# Patient Record
Sex: FEMALE | Race: WHITE | Employment: UNEMPLOYED | URBAN - METROPOLITAN AREA
[De-identification: names, ages, dates, MRNs, and addresses within clinical notes are randomized per-mention and may not be internally consistent; named-entity substitution may affect disease eponyms.]

---

## 2019-09-24 ENCOUNTER — OFFICE VISIT (OUTPATIENT)
Dept: URGENT CARE | Age: 24
End: 2019-09-24

## 2019-09-24 VITALS
WEIGHT: 111.9 LBS | HEIGHT: 62 IN | SYSTOLIC BLOOD PRESSURE: 113 MMHG | DIASTOLIC BLOOD PRESSURE: 57 MMHG | HEART RATE: 100 BPM | BODY MASS INDEX: 20.59 KG/M2 | OXYGEN SATURATION: 99 % | TEMPERATURE: 99.6 F | RESPIRATION RATE: 18 BRPM

## 2019-09-24 DIAGNOSIS — K59.03 DRUG-INDUCED CONSTIPATION: ICD-10-CM

## 2019-09-24 DIAGNOSIS — R21 RASH: Primary | ICD-10-CM

## 2019-09-24 RX ORDER — BUPRENORPHINE AND NALOXONE 8; 2 MG/1; MG/1
FILM, SOLUBLE BUCCAL; SUBLINGUAL DAILY
COMMUNITY

## 2019-09-24 NOTE — PATIENT INSTRUCTIONS
ER if fever, chills, nausea, vomiting or worsening abdominal pain     Constipation: Care Instructions  Your Care Instructions    Constipation means that you have a hard time passing stools (bowel movements). People pass stools from 3 times a day to once every 3 days. What is normal for you may be different. Constipation may occur with pain in the rectum and cramping. The pain may get worse when you try to pass stools. Sometimes there are small amounts of bright red blood on toilet paper or the surface of stools. This is because of enlarged veins near the rectum (hemorrhoids). A few changes in your diet and lifestyle may help you avoid ongoing constipation. Your doctor may also prescribe medicine to help loosen your stool. Some medicines can cause constipation. These include pain medicines and antidepressants. Tell your doctor about all the medicines you take. Your doctor may want to make a medicine change to ease your symptoms. Follow-up care is a key part of your treatment and safety. Be sure to make and go to all appointments, and call your doctor if you are having problems. It's also a good idea to know your test results and keep a list of the medicines you take. How can you care for yourself at home? · Drink plenty of fluids, enough so that your urine is light yellow or clear like water. If you have kidney, heart, or liver disease and have to limit fluids, talk with your doctor before you increase the amount of fluids you drink. · Include high-fiber foods in your diet each day. These include fruits, vegetables, beans, and whole grains. · Get at least 30 minutes of exercise on most days of the week. Walking is a good choice. You also may want to do other activities, such as running, swimming, cycling, or playing tennis or team sports. · Take a fiber supplement, such as Citrucel or Metamucil, every day. Read and follow all instructions on the label. · Schedule time each day for a bowel movement.  A daily routine may help. Take your time having your bowel movement. · Support your feet with a small step stool when you sit on the toilet. This helps flex your hips and places your pelvis in a squatting position. · Your doctor may recommend an over-the-counter laxative to relieve your constipation. Examples are Milk of Magnesia and MiraLax. Read and follow all instructions on the label. Do not use laxatives on a long-term basis. When should you call for help? Call your doctor now or seek immediate medical care if:    · You have new or worse belly pain.     · You have new or worse nausea or vomiting.     · You have blood in your stools.    Watch closely for changes in your health, and be sure to contact your doctor if:    · Your constipation is getting worse.     · You do not get better as expected. Where can you learn more? Go to http://[employer]jarett.info/. Enter 21 [id number] in the search box to learn more about \"Constipation: Care Instructions. \"  Current as of: June 26, 2019  Content Version: 12.2  © 6405-9956 [employer]. Care instructions adapted under license by [employer] (which disclaims liability or warranty for this information). If you have questions about a medical condition or this instruction, always ask your healthcare professional. [address] any warranty or liability for your use of this information. Rash: Care Instructions  Your Care Instructions  A rash is any irritation or inflammation of the skin. Rashes have many possible causes, including allergy, infection, illness, heat, and emotional stress. Follow-up care is a key part of your treatment and safety. Be sure to make and go to all appointments, and call your doctor if you are having problems. It's also a good idea to know your test results and keep a list of the medicines you take. How can you care for yourself at home? · Wash the area with water only.  Soap can make dryness and itching worse. Pat dry. · Put cold, wet cloths on the rash to reduce itching. · Keep cool, and stay out of the sun. · Leave the rash open to the air as much of the time as possible. · Sometimes petroleum jelly (Vaseline) can help relieve the discomfort caused by a rash. A moisturizing lotion, such as Cetaphil, also may help. Calamine lotion may help for rashes caused by contact with something (such as a plant or soap) that irritated the skin. Use it 3 or 4 times a day. · If your doctor prescribed a cream, use it as directed. If your doctor prescribed medicine, take it exactly as directed. · If your rash itches so badly that it interferes with your normal activities, take an over-the-counter antihistamine, such as diphenhydramine (Benadryl) or loratadine (Claritin). Read and follow all instructions on the label. When should you call for help? Call your doctor now or seek immediate medical care if:    · You have signs of infection, such as:  ? Increased pain, swelling, warmth, or redness. ? Red streaks leading from the area. ? Pus draining from the area. ? A fever.     · You have joint pain along with the rash.    Watch closely for changes in your health, and be sure to contact your doctor if:    · Your rash is changing or getting worse. For example, call if you have pain along with the rash, the rash is spreading, or you have new blisters.     · You do not get better after 1 week. Where can you learn more? Go to http://kim-jarett.info/. Enter B515 in the search box to learn more about \"Rash: Care Instructions. \"  Current as of: April 1, 2019  Content Version: 12.2  © 2408-6307 [employer]. Care instructions adapted under license by [employer] (which disclaims liability or warranty for this information).  If you have questions about a medical condition or this instruction, always ask your healthcare professional. [address] any warranty or liability for your use of this information.

## 2019-09-24 NOTE — PROGRESS NOTES
Rash    The history is provided by the patient. This is a new problem. The current episode started 2 days ago (son with similar rash). The problem has not changed since onset. The problem is associated with an unknown factor. There has been no fever. The rash is present on the left lower leg and right lower leg. The patient is experiencing no pain. The pain has been constant since onset. Pertinent negatives include no blisters, no itching and no pain. She has tried nothing for the symptoms. Constipation    This is a chronic (due to suboxone use) problem. The problem has not changed since onset. The problem occurs constantly. The pain is mild. The quality of the pain is described as dull. The pain does not radiate. Associated symptoms include abdominal pain. Pertinent negatives include no cough, no dizziness, no fever, no headaches, no nausea, no palpitations, no shortness of breath and no vomiting. Treatments tried: laxatives, stool softners. Past Medical History:   Diagnosis Date    Vaginal delivery         Past Surgical History:   Procedure Laterality Date    HX HEENT           History reviewed. No pertinent family history.      Social History     Socioeconomic History    Marital status: SINGLE     Spouse name: Not on file    Number of children: Not on file    Years of education: Not on file    Highest education level: Not on file   Occupational History    Not on file   Social Needs    Financial resource strain: Not on file    Food insecurity:     Worry: Not on file     Inability: Not on file    Transportation needs:     Medical: Not on file     Non-medical: Not on file   Tobacco Use    Smoking status: Current Every Day Smoker    Smokeless tobacco: Never Used   Substance and Sexual Activity    Alcohol use: No    Drug use: Not on file    Sexual activity: Not on file   Lifestyle    Physical activity:     Days per week: Not on file     Minutes per session: Not on file    Stress: Not on file Relationships    Social connections:     Talks on phone: Not on file     Gets together: Not on file     Attends Christian service: Not on file     Active member of club or organization: Not on file     Attends meetings of clubs or organizations: Not on file     Relationship status: Not on file    Intimate partner violence:     Fear of current or ex partner: Not on file     Emotionally abused: Not on file     Physically abused: Not on file     Forced sexual activity: Not on file   Other Topics Concern    Not on file   Social History Narrative    Not on file                ALLERGIES: Patient has no known allergies. Review of Systems   Constitutional: Negative for activity change, appetite change, chills and fever. HENT: Negative for congestion, ear pain, rhinorrhea, sinus pressure, sinus pain, sore throat and trouble swallowing. Respiratory: Negative for cough, shortness of breath and wheezing. Cardiovascular: Negative for chest pain and palpitations. Gastrointestinal: Positive for abdominal pain and constipation. Negative for nausea and vomiting. Musculoskeletal: Negative for myalgias. Skin: Positive for rash. Negative for itching. Neurological: Negative for dizziness and headaches. Hematological: Negative for adenopathy. Vitals:    09/24/19 1326   BP: 113/57   Pulse: 100   Resp: 18   Temp: 99.6 °F (37.6 °C)   SpO2: 99%   Weight: 111 lb 14.4 oz (50.8 kg)   Height: 5' 2\" (1.575 m)       Physical Exam   Constitutional: She appears well-developed and well-nourished. No distress. Abdominal: Soft. She exhibits no distension and no mass. There is generalized tenderness. There is no rigidity, no rebound and no guarding. Neurological: She is alert. Skin: Petechiae (BLE, diffuse, scattered, non-ttp, nonblanching) noted. She is not diaphoretic. Psychiatric: She has a normal mood and affect.  Her behavior is normal. Judgment and thought content normal.   Nursing note and vitals reviewed. [facility]    ICD-10-CM ICD-9-CM   1. Rash R21 782.1   2. Drug-induced constipation K59.03 564.09     E980.5     Rash likely viral. ER if fever, chills, N/V. Increase fluids, continue stool softener and laxative. The patients condition was discussed with the patient and they understand. The patient is to follow up with PCP. If signs and symptoms become worse the pt is to go to the ER. The patient is to take medications as prescribed. AVS given with patient instructions.             Procedures

## 2019-10-03 ENCOUNTER — OFFICE VISIT (OUTPATIENT)
Dept: URGENT CARE | Age: 24
End: 2019-10-03

## 2019-10-03 VITALS
BODY MASS INDEX: 20.43 KG/M2 | OXYGEN SATURATION: 98 % | HEIGHT: 62 IN | SYSTOLIC BLOOD PRESSURE: 108 MMHG | HEART RATE: 69 BPM | DIASTOLIC BLOOD PRESSURE: 62 MMHG | WEIGHT: 111 LBS | RESPIRATION RATE: 18 BRPM | TEMPERATURE: 98.9 F

## 2019-10-03 DIAGNOSIS — H66.92 ACUTE LEFT OTITIS MEDIA: Primary | ICD-10-CM

## 2019-10-03 RX ORDER — AMOXICILLIN 875 MG/1
875 TABLET, FILM COATED ORAL EVERY 12 HOURS
Qty: 20 TAB | Refills: 0 | Status: SHIPPED | OUTPATIENT
Start: 2019-10-03 | End: 2019-10-13

## 2019-10-03 RX ORDER — BENZONATATE 200 MG/1
200 CAPSULE ORAL
Qty: 30 CAP | Refills: 0 | Status: SHIPPED | OUTPATIENT
Start: 2019-10-03 | End: 2019-10-28

## 2019-10-03 NOTE — PATIENT INSTRUCTIONS

## 2019-10-03 NOTE — PROGRESS NOTES
Cold Symptoms   The history is provided by the patient. This is a new problem. Episode onset: 6 days ago. The problem occurs constantly. The problem has been gradually worsening. The cough is productive of sputum. There has been no fever. Associated symptoms include ear congestion (left), ear pain (left), rhinorrhea and sore throat. Pertinent negatives include no chest pain, no chills, no sweats, no headaches, no myalgias, no shortness of breath, no wheezing, no nausea and no vomiting. She has tried nothing for the symptoms. Past Medical History:   Diagnosis Date    Vaginal delivery         Past Surgical History:   Procedure Laterality Date    HX HEENT           History reviewed. No pertinent family history.      Social History     Socioeconomic History    Marital status: SINGLE     Spouse name: Not on file    Number of children: Not on file    Years of education: Not on file    Highest education level: Not on file   Occupational History    Not on file   Social Needs    Financial resource strain: Not on file    Food insecurity:     Worry: Not on file     Inability: Not on file    Transportation needs:     Medical: Not on file     Non-medical: Not on file   Tobacco Use    Smoking status: Current Every Day Smoker    Smokeless tobacco: Never Used   Substance and Sexual Activity    Alcohol use: No    Drug use: Not on file    Sexual activity: Not on file   Lifestyle    Physical activity:     Days per week: Not on file     Minutes per session: Not on file    Stress: Not on file   Relationships    Social connections:     Talks on phone: Not on file     Gets together: Not on file     Attends Roman Catholic service: Not on file     Active member of club or organization: Not on file     Attends meetings of clubs or organizations: Not on file     Relationship status: Not on file    Intimate partner violence:     Fear of current or ex partner: Not on file     Emotionally abused: Not on file     Physically abused: Not on file     Forced sexual activity: Not on file   Other Topics Concern    Not on file   Social History Narrative    Not on file                ALLERGIES: Patient has no known allergies. Review of Systems   Constitutional: Negative for activity change, appetite change, chills and fever. HENT: Positive for ear pain (left), rhinorrhea and sore throat. Negative for congestion, ear discharge, sinus pressure, sinus pain and trouble swallowing. Respiratory: Negative for cough, shortness of breath and wheezing. Cardiovascular: Negative for chest pain and palpitations. Gastrointestinal: Negative for nausea and vomiting. Musculoskeletal: Negative for myalgias. Neurological: Negative for dizziness and headaches. Hematological: Negative for adenopathy. Vitals:    10/03/19 1018   BP: 108/62   Pulse: 69   Resp: 18   Temp: 98.9 °F (37.2 °C)   SpO2: 98%   Weight: 111 lb (50.3 kg)   Height: 5' 2\" (1.575 m)       Physical Exam   Constitutional: She appears well-developed and well-nourished. No distress. HENT:   Right Ear: Tympanic membrane, external ear and ear canal normal.   Left Ear: External ear and ear canal normal. Tympanic membrane is erythematous and bulging. A middle ear effusion is present. Nose: Nose normal. Right sinus exhibits no maxillary sinus tenderness and no frontal sinus tenderness. Left sinus exhibits no maxillary sinus tenderness and no frontal sinus tenderness. Mouth/Throat: Oropharynx is clear and moist and mucous membranes are normal. No oropharyngeal exudate, posterior oropharyngeal edema, posterior oropharyngeal erythema or tonsillar abscesses. Cardiovascular: Normal rate, regular rhythm and normal heart sounds. Pulmonary/Chest: Effort normal and breath sounds normal. No respiratory distress. She has no wheezes. She has no rales. Lymphadenopathy:     She has no cervical adenopathy. Neurological: She is alert. Skin: She is not diaphoretic.    Psychiatric: She has a normal mood and affect. Her behavior is normal. Judgment and thought content normal.   Nursing note and vitals reviewed. MDM    ICD-10-CM ICD-9-CM   1. Acute left otitis media H66.92 382.9     Medications Ordered Today   Medications    amoxicillin (AMOXIL) 875 mg tablet     Sig: Take 1 Tab by mouth every twelve (12) hours for 10 days. Dispense:  20 Tab     Refill:  0    benzonatate (TESSALON) 200 mg capsule     Sig: Take 1 Cap by mouth three (3) times daily as needed for Cough. Dispense:  30 Cap     Refill:  0     The patients condition was discussed with the patient and they understand. The patient is to follow up with PCP. If signs and symptoms become worse the pt is to go to the ER. The patient is to take medications as prescribed. AVS given with patient instructions.             Procedures

## 2019-10-28 ENCOUNTER — OFFICE VISIT (OUTPATIENT)
Dept: URGENT CARE | Age: 24
End: 2019-10-28

## 2019-10-28 VITALS
HEART RATE: 88 BPM | TEMPERATURE: 98.3 F | OXYGEN SATURATION: 98 % | SYSTOLIC BLOOD PRESSURE: 107 MMHG | BODY MASS INDEX: 20.8 KG/M2 | HEIGHT: 62 IN | DIASTOLIC BLOOD PRESSURE: 55 MMHG | RESPIRATION RATE: 18 BRPM | WEIGHT: 113 LBS

## 2019-10-28 DIAGNOSIS — J20.8 ACUTE VIRAL BRONCHITIS: Primary | ICD-10-CM

## 2019-10-28 RX ORDER — BENZONATATE 200 MG/1
200 CAPSULE ORAL
Qty: 21 CAP | Refills: 0 | Status: SHIPPED | OUTPATIENT
Start: 2019-10-28 | End: 2019-11-04

## 2019-10-28 RX ORDER — ALBUTEROL SULFATE 0.83 MG/ML
2.5 SOLUTION RESPIRATORY (INHALATION)
Status: COMPLETED | OUTPATIENT
Start: 2019-10-28 | End: 2019-10-28

## 2019-10-28 RX ORDER — FLUTICASONE PROPIONATE 50 MCG
2 SPRAY, SUSPENSION (ML) NASAL DAILY
Qty: 1 BOTTLE | Refills: 0 | Status: SHIPPED | OUTPATIENT
Start: 2019-10-28 | End: 2021-02-25

## 2019-10-28 RX ORDER — ALBUTEROL SULFATE 90 UG/1
2 AEROSOL, METERED RESPIRATORY (INHALATION)
Qty: 1 INHALER | Refills: 0 | Status: SHIPPED | OUTPATIENT
Start: 2019-10-28 | End: 2021-02-25

## 2019-10-28 RX ORDER — PREDNISONE 20 MG/1
20 TABLET ORAL 2 TIMES DAILY
Qty: 10 TAB | Refills: 0 | Status: SHIPPED | OUTPATIENT
Start: 2019-10-28 | End: 2019-11-02

## 2019-10-28 RX ORDER — CETIRIZINE HCL 10 MG
10 TABLET ORAL DAILY
Qty: 30 TAB | Refills: 0 | Status: SHIPPED | OUTPATIENT
Start: 2019-10-28

## 2019-10-28 RX ADMIN — ALBUTEROL SULFATE 2.5 MG: 0.83 SOLUTION RESPIRATORY (INHALATION) at 11:56

## 2019-10-28 NOTE — PROGRESS NOTES
Cold Symptoms   The history is provided by the patient. This is a new problem. The current episode started more than 1 week ago. The problem occurs constantly. The problem has not changed since onset. The cough is non-productive. There has been no fever. Associated symptoms include rhinorrhea, sore throat and shortness of breath. Pertinent negatives include no chills and no wheezing. She has tried nothing for the symptoms. She is not a smoker. Her past medical history is significant for bronchitis. Her past medical history does not include asthma. Past Medical History:   Diagnosis Date    Vaginal delivery         Past Surgical History:   Procedure Laterality Date    HX HEENT           History reviewed. No pertinent family history.      Social History     Socioeconomic History    Marital status: SINGLE     Spouse name: Not on file    Number of children: Not on file    Years of education: Not on file    Highest education level: Not on file   Occupational History    Not on file   Social Needs    Financial resource strain: Not on file    Food insecurity:     Worry: Not on file     Inability: Not on file    Transportation needs:     Medical: Not on file     Non-medical: Not on file   Tobacco Use    Smoking status: Current Every Day Smoker    Smokeless tobacco: Never Used   Substance and Sexual Activity    Alcohol use: No    Drug use: Not on file    Sexual activity: Not on file   Lifestyle    Physical activity:     Days per week: Not on file     Minutes per session: Not on file    Stress: Not on file   Relationships    Social connections:     Talks on phone: Not on file     Gets together: Not on file     Attends Gnosticist service: Not on file     Active member of club or organization: Not on file     Attends meetings of clubs or organizations: Not on file     Relationship status: Not on file    Intimate partner violence:     Fear of current or ex partner: Not on file     Emotionally abused: Not on file     Physically abused: Not on file     Forced sexual activity: Not on file   Other Topics Concern    Not on file   Social History Narrative    Not on file                ALLERGIES: Patient has no known allergies. Review of Systems   Constitutional: Negative for chills. HENT: Positive for congestion, postnasal drip, rhinorrhea, sneezing and sore throat. Negative for trouble swallowing. Respiratory: Positive for cough, chest tightness and shortness of breath. Negative for wheezing. All other systems reviewed and are negative. Vitals:    10/28/19 1134   BP: 107/55   Pulse: 88   Resp: 18   Temp: 98.3 °F (36.8 °C)   SpO2: 98%   Weight: 113 lb (51.3 kg)   Height: 5' 2\" (1.575 m)       Physical Exam   Constitutional: No distress. HENT:   Right Ear: Tympanic membrane and ear canal normal.   Left Ear: Tympanic membrane and ear canal normal.   Nose: Nose normal.   Mouth/Throat: No oropharyngeal exudate, posterior oropharyngeal edema or posterior oropharyngeal erythema. Eyes: Conjunctivae are normal. Right eye exhibits no discharge. Left eye exhibits no discharge. Neck: Neck supple. Pulmonary/Chest: Effort normal. No respiratory distress. She has decreased breath sounds. She has no wheezes. She has rhonchi. She has no rales. Lymphadenopathy:     She has no cervical adenopathy. Skin: No rash noted. Nursing note and vitals reviewed. MDM    Procedures      ICD-10-CM ICD-9-CM    1. Acute viral bronchitis J20.8 466.0      Medications Ordered Today   Medications    albuterol (PROVENTIL VENTOLIN) nebulizer solution 2.5 mg     Order Specific Question:   MODE OF DELIVERY     Answer:   Nebulizer    albuterol (PROVENTIL HFA, VENTOLIN HFA, PROAIR HFA) 90 mcg/actuation inhaler     Sig: Take 2 Puffs by inhalation every six (6) hours as needed for Wheezing.      Dispense:  1 Inhaler     Refill:  0    benzonatate (TESSALON) 200 mg capsule     Sig: Take 1 Cap by mouth three (3) times daily as needed for Cough for up to 7 days. Dispense:  21 Cap     Refill:  0    predniSONE (DELTASONE) 20 mg tablet     Sig: Take 20 mg by mouth two (2) times a day for 5 days. With food     Dispense:  10 Tab     Refill:  0    cetirizine (ZYRTEC) 10 mg tablet     Sig: Take 1 Tab by mouth daily. Dispense:  30 Tab     Refill:  0    fluticasone propionate (FLONASE) 50 mcg/actuation nasal spray     Si Sprays by Both Nostrils route daily. Dispense:  1 Bottle     Refill:  0     No results found for any visits on 10/28/19. The patients condition was discussed with the patient and they understand. The patient is to follow up with primary care doctor. If signs and symptoms become worse the pt is to go to the ER. The patient is to take medications as prescribed.

## 2019-10-28 NOTE — PROGRESS NOTES
Cold Symptoms   The history is provided by the patient. This is a new problem. The current episode started more than 1 week ago. The problem occurs every few minutes. The problem has not changed since onset. The cough is non-productive. There has been no fever. Associated symptoms include rhinorrhea, sore throat, shortness of breath and wheezing. Pertinent negatives include no chest pain and no ear congestion. She has tried nothing for the symptoms. Her past medical history is significant for bronchitis. Past Medical History:   Diagnosis Date    Vaginal delivery         Past Surgical History:   Procedure Laterality Date    HX HEENT           History reviewed. No pertinent family history.      Social History     Socioeconomic History    Marital status: SINGLE     Spouse name: Not on file    Number of children: Not on file    Years of education: Not on file    Highest education level: Not on file   Occupational History    Not on file   Social Needs    Financial resource strain: Not on file    Food insecurity:     Worry: Not on file     Inability: Not on file    Transportation needs:     Medical: Not on file     Non-medical: Not on file   Tobacco Use    Smoking status: Current Every Day Smoker    Smokeless tobacco: Never Used   Substance and Sexual Activity    Alcohol use: No    Drug use: Not on file    Sexual activity: Not on file   Lifestyle    Physical activity:     Days per week: Not on file     Minutes per session: Not on file    Stress: Not on file   Relationships    Social connections:     Talks on phone: Not on file     Gets together: Not on file     Attends [employer] service: Not on file     Active member of club or organization: Not on file     Attends meetings of clubs or organizations: Not on file     Relationship status: Not on file    Intimate partner violence:     Fear of current or ex partner: Not on file     Emotionally abused: Not on file     Physically abused: Not on file Forced sexual activity: Not on file   Other Topics Concern    Not on file   Social History Narrative    Not on file                ALLERGIES: Patient has no known allergies. Review of Systems   HENT: Positive for congestion, rhinorrhea and sore throat. Respiratory: Positive for chest tightness, shortness of breath and wheezing. Cardiovascular: Negative for chest pain. All other systems reviewed and are negative. Vitals:    10/28/19 1134   BP: 107/55   Pulse: 88   Resp: 18   Temp: 98.3 °F (36.8 °C)   SpO2: 98%   Weight: 113 lb (51.3 kg)   Height: 5' 2\" (1.575 m)       Physical Exam   Constitutional: No distress. HENT:   Right Ear: Tympanic membrane and ear canal normal.   Left Ear: Tympanic membrane and ear canal normal.   Nose: Nose normal.   Mouth/Throat: No oropharyngeal exudate, posterior oropharyngeal edema or posterior oropharyngeal erythema. Eyes: Conjunctivae are normal. Right eye exhibits no discharge. Left eye exhibits no discharge. Neck: Neck supple. Pulmonary/Chest: Effort normal. No respiratory distress. She has decreased breath sounds. She has no wheezes. She has rhonchi. She has no rales. Lymphadenopathy:     She has no cervical adenopathy. Skin: No rash noted. Nursing note and vitals reviewed. MDM    Procedures      ICD-10-CM ICD-9-CM    1. Acute viral bronchitis J20.8 466.0      Medications Ordered Today   Medications    albuterol (PROVENTIL VENTOLIN) nebulizer solution 2.5 mg     Order Specific Question:   MODE OF DELIVERY     Answer:   Nebulizer    albuterol (PROVENTIL HFA, VENTOLIN HFA, PROAIR HFA) 90 mcg/actuation inhaler     Sig: Take 2 Puffs by inhalation every six (6) hours as needed for Wheezing. Dispense:  1 Inhaler     Refill:  0    benzonatate (TESSALON) 200 mg capsule     Sig: Take 1 Cap by mouth three (3) times daily as needed for Cough for up to 7 days.      Dispense:  21 Cap     Refill:  0    predniSONE (DELTASONE) 20 mg tablet     Sig: Take 20 mg by mouth two (2) times a day for 5 days. With food     Dispense:  10 Tab     Refill:  0    cetirizine (ZYRTEC) 10 mg tablet     Sig: Take 1 Tab by mouth daily. Dispense:  30 Tab     Refill:  0    fluticasone propionate (FLONASE) 50 mcg/actuation nasal spray     Si Sprays by Both Nostrils route daily. Dispense:  1 Bottle     Refill:  0     No results found for any visits on 10/28/19. The patients condition was discussed with the patient and they understand. The patient is to follow up with primary care doctor. If signs and symptoms become worse the pt is to go to the ER. The patient is to take medications as prescribed.

## 2019-11-08 ENCOUNTER — DOCUMENTATION ONLY (OUTPATIENT)
Dept: URGENT CARE | Age: 24
End: 2019-11-08

## 2020-10-22 ENCOUNTER — OFFICE VISIT (OUTPATIENT)
Dept: FAMILY MEDICINE CLINIC | Age: 25
End: 2020-10-22
Payer: MEDICAID

## 2020-10-22 VITALS
HEIGHT: 63 IN | TEMPERATURE: 97.2 F | DIASTOLIC BLOOD PRESSURE: 67 MMHG | RESPIRATION RATE: 17 BRPM | HEART RATE: 89 BPM | WEIGHT: 122.5 LBS | SYSTOLIC BLOOD PRESSURE: 107 MMHG | OXYGEN SATURATION: 98 % | BODY MASS INDEX: 21.71 KG/M2

## 2020-10-22 DIAGNOSIS — F33.1 MODERATE EPISODE OF RECURRENT MAJOR DEPRESSIVE DISORDER (HCC): ICD-10-CM

## 2020-10-22 DIAGNOSIS — F41.9 ANXIETY: ICD-10-CM

## 2020-10-22 DIAGNOSIS — Z76.89 ENCOUNTER TO ESTABLISH CARE: Primary | ICD-10-CM

## 2020-10-22 PROCEDURE — 99203 OFFICE O/P NEW LOW 30 MIN: CPT | Performed by: NURSE PRACTITIONER

## 2020-10-22 PROCEDURE — 4062F PT REFERRAL PSYCH DOCD: CPT | Performed by: NURSE PRACTITIONER

## 2020-10-22 RX ORDER — ESCITALOPRAM OXALATE 10 MG/1
10 TABLET ORAL DAILY
Qty: 30 TAB | Refills: 1 | Status: SHIPPED | OUTPATIENT
Start: 2020-10-22 | End: 2021-04-02

## 2020-10-22 NOTE — PROGRESS NOTES
Aries Amor is a 30 y.o. female    HIPAA verified by two patient identifiers. Chief Complaint   Patient presents with   [address] [place] [address]     1. Have you been to the ER, urgent care clinic since your last visit? Hospitalized since your last visit? No    2. Have you seen or consulted any other health care providers outside of the [address] [place] [address] since your last visit? Include any pap smears or colon screening. No    June 22, 2020 broke right hip and sternum, pt states that she is seeing someone at [facility] for hip but needs to have someone look at her sternum.     Visit Vitals  /67 (BP 1 Location: Left arm, BP Patient Position: Sitting)   Pulse 89   Temp 97.2 °F (36.2 °C) (Temporal)   Resp 17   Ht 5' 3\" (1.6 m)   Wt 122 lb 8 oz (55.6 kg)   SpO2 98%   BMI 21.70 kg/m²       Pain Scale: 4/10  Pain Location: Hip

## 2020-10-22 NOTE — PATIENT INSTRUCTIONS
1)      You have been prescribed Lexapro (escitalopram) 10mg which is a selective serotonin reuptake inhibitor (SSRI) used to treat low mood (depression) and/or anxiety. Serotonin is a chemical messenger in the brain that affects mood, social behavior, appetite, sexual desire, memory and sleep. SSRIs block the reabsorption of serotonin in the brain, so there is more serotonin available. Please take this medication daily and return to the clinic in 4 weeks to evaluate the effectiveness and dose. Please take this medication daily. Most people feel an effect within 1-2 weeks of starting the medication, but it can take up to 6-12 weeks to feel the full effect of the anti-depressant. Its important to keep in mind that each persons response to antidepressants is different, so give it a few weeks to start working. Side effects may include drowsiness, insomnia, weight change, dry mouth. If you are having uncomfortable side effects, tell your healthcare provider. Some side effects go away in 2-3 weeks. It might be possible to find a dose or alternate medication that causes fewer side effects if they are still bothering you after 6 weeks of trying the medication. Finding the right medication (or combination of medications) and the right doses can take some trial and error, so try not to get discouraged. Research shows that psychotherapy and antidepressants may be the best therapies for depression. Please look for a therapist.  Check with your insurance company for referrals for providers in the area that will be covered under your plan. Do not stop taking this drug all of a sudden without calling your healthcare provider. You may have a greater risk of side effects. If you need to stop this drug, you will want to slowly stop it as ordered    Avoid drinking alcohol while taking this drug.  Talk with your healthcare provider before you use other drugs and natural products that slow your actions.     In depression, sleep and appetite may get better soon after starting this drug. Other low mood signs may take up to 4 weeks to get better. Warning: Children and teens who take this drug may be at a greater risk of having thoughts or actions of suicide. Adults may also be at risk. The risk may be greater in people who have had these thoughts or actions in the past. Watch people who take this drug closely. Call the office right away if signs like low mood (depression), nervousness, restlessness, grouchiness, panic attacks, or changes in mood or actions are new or worse. Call 911 right away if any thoughts or actions of suicide occur. · Keep the numbers for these national suicide hotlines: 0-424-980-TALK (0-395.291.6135) and 0-988-RKNVLIH (5-453.963.1035). If you or someone you know talks about suicide or feeling hopeless, get help right away. Clinical depression is a medical condition that goes beyond everyday sadness. Depression may cause serious, long-lasting symptoms and often disrupts a persons ability to perform routine tasks. The disorder is the most common psychiatric disorder worldwide. In the [place], about one in six people experiences a bout of clinical depression in their lifetime. There are multiple therapies available to help with depression including psychotherapy, exercise (aerobic exercise and yoga are highly recommended), proper diet and sleep as well as medications. Research shows that psychotherapy and antidepressants may be the best therapies for depression. Please look for a therapist.  Check with your insurance company for referrals for providers in the area that will be covered under your plan. · Keep the numbers for these national suicide hotlines: 5-350-375-TALK (2-381.864.4368) and 8-467-CJEHOJD (8-273.730.8119). If you or someone you know talks about suicide or feeling hopeless, get help right away.   ·   Steps you can do on your own to feel better:  Deep breathing exercises: Deep breathing triggers a relaxation response, helping to change from the \"fight-or-flight\" response anxiety brings on. Inhale slowly to a count of 4, starting at your belly and then moving into your chest.  Gently hold your breath for 4 counts, then slowly exhale to 4 counts. \"Clench\" exercise - clench various zones in your body for 30 seconds, then an overall body clench  Exercise can help many people feel less anxious. Regular cardiovascular exercise (such as fast walking,) release endorphins - the \"feel good\" hormone in our body - and can reduce anxiety. Proper sleep:  Sleep is important to overall health. Not getting enough sleep can cause fatigue, inattention, and irritabililty, causing anxiety levels to increase. Healthy Diet: a healthy diet rich in whole grains, vegetables and fruits is healthier than simple carbohydrates found in processed foods. Skipping meals can cause blood sugar to drop and cause jittery feelings that could worsening underlying anxiety. It also a good idea to cut down on or stop drinking coffee and other sources of caffeine. Caffeine can make anxiety worse. Find \"Me\" time - it is important to take some time just to focus on you and help alleviate stress in daily life. This could be daily exercise, walking the dog, sitting in a quiet place without distraction, meditation, etc.     Medical treatments include:  ? Psychotherapy - Psychotherapy involves meeting with a mental health counselor to talk about your feelings, relationships, and worries. Therapy can help you find new ways of thinking about your situation so that you feel less anxious. In therapy, you might also learn new skills to reduce anxiety. You can go to Psychology Today's website to find a counselor. · Go to www. [employer]. [employer]  · Enter your zip code. · Click on your insurance carrier (usually on left side of screen). · Then click any other parameters you desire.    This will result in a list of providers. Click on any provider to learn more about them or see the contact information. Please choose a provider, call them, and schedule an appointment. ?Medicines - Medicines used to treat depression and can relieve anxiety. Some people have psychotherapy and take medicines at the same time. Phone apps that can help with anxiety:  CALM - Use the principles of mindfulness and meditation to ease your mind and keep anxiety at [address] [place] [address]. The AnTuTuene interface is just the beginning. Once it opens, there are relaxing sounds and sleep stories. Enjoy guided meditations at various lengths to help with everything from building self-esteem to calming anxiety. RAY - Self-help for Anxiety Management - range of self-help methods. Helps you understand what causes your anxiety, monitor your anxious thoughts and behavior over time and manage your anxiety through self-help exercises and private reflection. SIMPLE HABIT - guided meditation for anxiety relief    YOUPER  - emotional health assistant. The alexander employs a mood tracker, a chat interface, and guided mindfulness and learns from each user with AI to deliver the most effective support available. [facility] alexander that allows you to speak to a therapist without leaving home, connecting you to [id number] of licensed therapists with medical training. BOOSTERBUDDY   This alexander offers a novel way for teens and young adults to improve their resilience and work toward being healthier both physically and emotionally. 7 CUPS  7 Cups uses trained, volunteer, active listeners to provide free, anonymous, and confidential emotional support to people needing help coping with acute stressors and long-term mental health issues. Leena Arcos 82 apps provide people with quick resources for tracking their cognitive distortions, activities, and safety plan in case of an emergency.       SLEEPBOT   SleepBot is a quick and simple way for people to log their sleep and improve their sleep hygiene. [facility] ALEXANDER   The Whats Up? alexander helps people monitor their mood and uses principles of CBT and acceptance commitment therapy (ACT) to help people reframe their thoughts and cognitive distortions. Other apps: Mitch, [facility], [place], [place], [employer], [facility], [place]  Quit That!  - alexander for addictions    [address]: [phone]  Crisis: [address] [place] [address] Avenue: [phone]  Crisis: [address]: [phone]  Crisis: [phone]    2) Ask [facility] if they have a referral for sternum - otherwise, send me a [employer] Karmat message and I can put one in for you. However, as we discussed, good to have the orthopedic issues with one practice for continuity of care. 3) Allergy management:     1)  Take a daily antihistamine to reduce allergic symptoms such as sneezing, runny nose and itchy eyes. You can buy these over the counter (OTC = no prescription needed) such as Claritin, Allegra or Zyrtec. Take this daily as it takes 3-4 weeks for the full therapeutic effect to take place. Follow directions on package. If symptoms of sneezing, coughing and runny nose are severe, you can try taking Benadryl at night before bed. However, Benadryl can cause drowsiness, so please use caution. Elderly people should not use Benadryl due to side effects and increase risk of falling. 2)  Use an OTC decongestant nasal spray such as Flonase, Nasonex, or Rhinocort. These contain a steroid and will help reduce congestion for 3-5 days. You can spray 2x in each nostril two times a day. Use the opposite hand of the nostril you are spraying and look down at your toes when you administer the nasal spray. This ensures the spray is applied to the nasal tissue properly.       If you use Afrin for nasal congestion, DO NOT use for more than 5 days (due to rebound congestion)     3) You can also use a saline nasal spray 3-4 times a day or a shantel potty (never use tap water due to possible bacterial contamination) to help flush out the sinuses. This helps reduce the irritants that can increase allergic symptoms. 4)  OTC Robitussin or Delsym for cough relief. Follow directions on package. Do not exceed maximum dose. May cause drowsiness. If you have high blood pressure or kidney problems, avoid cough medicines that contain decongestants -- such as pseudoephedrine, ephedrine, phenylephrine, naphazoline and oxymetazoline. 5) Warm salt water gargle can help alleviate sore throat    6) Try taking a teaspoon of local honey 2x day (but no more than 1 tablespoon a day) - to help strengthen your body's tolerance to allergens. It is important to buy local honey as the bees use plants in your area to produce their honey. Honey is also a natural cough suppressant. Eat a healthy diet, drink plenty of water and get 8 hours of sleep nightly. 4) Migraine  Migraine headaches are one of the most disabling illnesses in the world. Although they can affect men, women and children, women have a higher rate of migraine headaches. Lifestyle modifications that must be started to prevent migraines include eating a healthy diet, daily exercise, getting a minimum of 7-8 hours sleep at night, and stop smoking. Additionally, keep a journal of when migraines occur, food eaten, stress, etc to better identify triggers. Treatment is aimed at preventative (daily medication) or abortive (symptom management once migraine occurs). Abortive treatment includes over the counter NSAIDS, (such as Excedrin Migraine, naproxen, ibuprofen), lidocaine cream and triptans. Preventative treatment includes beta blockers, antidepressants and anticonvulsant medications.  Please note that Fioricet is NOT recommended as a treatment for migraines. Complementary Alternative Medicine (CAM) for Migraines    The following therapies have been shown to help reduce the frequency and pain intensity associated with migraines:  1) Yoga - reduces anxiety/depression, helps with back pain as well. Therapy: 5 times per week  2) Aerobic exercise - affects cardiopulmonary, inflammatory and neurovascular processes. Additionally, endorphins released during exercise are \"natural\" painkllers  Therapy: individual, based on health and fitness level  2) Butterbur (Petasites hyubrius) - an herb with anti-inflammatory properties; Dose: 50-75 mg twice a day  3) Feverfew (tanacetum parthenium) - an herb used for second line treatment with anti-inflammatory properties. Dose:  mg/daily    Massage of the trapezius and supraspinatus fibers (areas around the neck and shoulders) may help reduce pain intensity during migraines     Some migraine sufferers may have low levels of minerals and vitamins used at the cellular level. Taking supplements of Coenzyme Q10 (150-300mg/day), magnesium (400mg/daily) and/or riboflavin (400mg/daily) may help prevent migraines. There is a supplement called Migrelief - which is a combination of things that are typically low in migraine sufferers (riboflavin, magnesium, feverfew, Chasteberry (can reduce effectiveness of OBC bc estrogen), L-Theanine (amino acid) D-Biotin (BS stabilizers)  As with all supplements, herbs and vitamins, please be aware of any interactions that might occur with current medications. Keep a diary of migraines - include when symptoms start, how often they occur, where pain is located and type of pain, other symptoms (such as nausea, vision problems), how long headaches last, what treatment you took and how effective it is.   Also helpful to record diet, exercise, vitamins, amount of sleep, weather, menstrual cycle, etc.     If headache becomes \"the worst headache of your life\", you have changes in vision, experience weakness, numbness or tingling, or spike a fever, seek immediate medical attention, call 911. Recovering From Depression: Care Instructions  Your Care Instructions     Taking good care of yourself is important as you recover from depression. In time, your symptoms will fade as your treatment takes hold. Do not give up. Instead, focus your energy on getting better. Your mood will improve. It just takes some time. Focus on things that can help you feel better, such as being with friends and family, eating well, and getting enough rest. But take things slowly. Do not do too much too soon. You will begin to feel better gradually. Follow-up care is a key part of your treatment and safety. Be sure to make and go to all appointments, and call your doctor if you are having problems. It's also a good idea to know your test results and keep a list of the medicines you take. How can you care for yourself at home? Be realistic  · If you have a large task to do, break it up into smaller steps you can handle, and just do what you can. · You may want to put off important decisions until your depression has lifted. If you have plans that will have a major impact on your life, such as marriage, divorce, or a job change, try to wait a bit. Talk it over with friends and loved ones who can help you look at the overall picture first.  · Reaching out to people for help is important. Do not isolate yourself. Let your family and friends help you. Find someone you can trust and confide in, and talk to that person. · Be patient, and be kind to yourself. Remember that depression is not your fault and is not something you can overcome with willpower alone. Treatment is important for depression, just like for any other illness. Feeling better takes time, and your mood will improve little by little. Stay active  · Stay busy and get outside. Take a walk, or try some other light exercise.   · Talk with your doctor about an exercise program. Exercise can help with mild depression. · Go to a movie or concert. Take part in a Judaism activity or other social gathering. Go to a ball game. · Ask a friend to have dinner with you. Take care of yourself  · Eat a balanced diet with plenty of fresh fruits and vegetables, whole grains, and lean protein. If you have lost your appetite, eat small snacks rather than large meals. · Avoid using illegal drugs or marijuana and drinking alcohol. Do not take medicines that have not been prescribed for you. They may interfere with medicines you may be taking for depression, or they may make your depression worse. · Take your medicines exactly as they are prescribed. You may start to feel better within 1 to 3 weeks of taking antidepressant medicine. But it can take as many as 6 to 8 weeks to see more improvement. If you have questions or concerns about your medicines, or if you do not notice any improvement by 3 weeks, talk to your doctor. · Continue to take your medicine after your symptoms improve. Taking your medicine for at least 6 months after you feel better can help keep you from getting depressed again. If this isn't the first time you have been depressed, your doctor may recommend you to take medicine even longer. · If you have any side effects from your medicine, tell your doctor. Many side effects are mild and will go away on their own after you have been taking the medicine for a few weeks. Some may last longer. Talk to your doctor if side effects are bothering you too much. You might be able to try a different medicine. · Continue counseling. It may help prevent depression from returning, especially if you've had multiple episodes of depression. Talk with your counselor if you are having a hard time attending your sessions or you think the sessions aren't working. Don't just stop going. · Get enough sleep. Talk to your doctor if you are having problems sleeping.   · Avoid sleeping pills unless they are prescribed by the doctor treating your depression. Sleeping pills may make you groggy during the day, and they may interact with other medicine you are taking. · If you have any other illnesses, such as diabetes, heart disease, or high blood pressure, make sure to continue with your treatment. Tell your doctor about all of the medicines you take, including those with or without a prescription. · If you or someone you know talks about suicide, self-harm, or feeling hopeless, get help right away. Call the [address] [place] [address] at 1-800-273-talk (0-207.511.8643) or text HOME to 795996 to access the Crisis Text Line. Consider saving these numbers in your phone. When should you call for help? Call 911 anytime you think you may need emergency care. For example, call if:    · You feel like hurting yourself or someone else.     · Someone you know has depression and is about to attempt or is attempting suicide. Call your doctor now or seek immediate medical care if:    · You hear voices.     · Someone you know has depression and:  ? Starts to give away his or her possessions. ? Uses illegal drugs or drinks alcohol heavily. ? Talks or writes about death, including writing suicide notes or talking about guns, knives, or pills. ? Starts to spend a lot of time alone. ? Acts very aggressively or suddenly appears calm. Watch closely for changes in your health, and be sure to contact your doctor if:    · You do not get better as expected. Where can you learn more? Go to http://www.gray.com/  Enter N529 in the search box to learn more about \"Recovering From Depression: Care Instructions. \"  Current as of: January 31, 2020               Content Version: 12.6  © 6094-4169 [employer]. Care instructions adapted under license by [employer] (which disclaims liability or warranty for this information).  If you have questions about a medical condition or this instruction, always ask your healthcare professional. [place] [address] any warranty or liability for your use of this information.

## 2020-11-25 ENCOUNTER — OFFICE VISIT (OUTPATIENT)
Dept: URGENT CARE | Age: 25
End: 2020-11-25
Payer: MEDICAID

## 2020-11-25 VITALS — RESPIRATION RATE: 15 BRPM | OXYGEN SATURATION: 99 % | TEMPERATURE: 98.8 F | HEART RATE: 77 BPM

## 2020-11-25 DIAGNOSIS — R05.9 COUGH: ICD-10-CM

## 2020-11-25 DIAGNOSIS — Z20.822 ENCOUNTER FOR LABORATORY TESTING FOR COVID-19 VIRUS: Primary | ICD-10-CM

## 2020-11-25 DIAGNOSIS — R09.81 NASAL CONGESTION: ICD-10-CM

## 2020-11-25 PROCEDURE — 99213 OFFICE O/P EST LOW 20 MIN: CPT | Performed by: EMERGENCY MEDICINE

## 2020-11-25 NOTE — PROGRESS NOTES
Pt here with positive COVID exposure and then on Sat some mild cold like sx with congestion, throat irritation and then cough and fatigue. Sxs are getting better but wants tested. No FCNV SOB. This patient was seen in [facility] at [address] [place] [address] Urgent Care while outdoors at their vehicle due to COVID-19 pandemic with PPE and focused examination in order to decrease community viral transmission       The history is provided by the patient. Past Medical History:   Diagnosis Date    Vaginal delivery         Past Surgical History:   Procedure Laterality Date    HX HEENT           History reviewed. No pertinent family history.      Social History     Socioeconomic History    Marital status: SINGLE     Spouse name: Not on file    Number of children: Not on file    Years of education: Not on file    Highest education level: Not on file   Occupational History    Not on file   Social Needs    Financial resource strain: Not on file    Food insecurity     Worry: Not on file     Inability: Not on file    Transportation needs     Medical: Not on file     Non-medical: Not on file   Tobacco Use    Smoking status: Current Every Day Smoker    Smokeless tobacco: Never Used   Substance and Sexual Activity    Alcohol use: No    Drug use: Not on file    Sexual activity: Not on file   Lifestyle    Physical activity     Days per week: Not on file     Minutes per session: Not on file    Stress: Not on file   Relationships    Social connections     Talks on phone: Not on file     Gets together: Not on file     Attends Oriental orthodox service: Not on file     Active member of club or organization: Not on file     Attends meetings of clubs or organizations: Not on file     Relationship status: Not on file    Intimate partner violence     Fear of current or ex partner: Not on file     Emotionally abused: Not on file     Physically abused: Not on file     Forced sexual activity: Not on file   Other Topics Concern    Not on file   Social History Narrative    Not on file                ALLERGIES: Patient has no known allergies. Review of Systems   Constitutional: Positive for fatigue. Negative for chills, diaphoresis and fever. HENT: Positive for congestion and sore throat (mld, scratchy). Negative for sinus pressure, sneezing, trouble swallowing and voice change. Respiratory: Positive for cough (on and off, some chronic bc smoker). Negative for chest tightness and shortness of breath. Cardiovascular: Negative for chest pain. Gastrointestinal: Negative for abdominal pain, diarrhea, nausea and vomiting. Musculoskeletal: Negative for myalgias. Skin: Negative for rash. Neurological: Negative for headaches. Vitals:    11/25/20 1750   Pulse: 77   Resp: 15   Temp: 98.8 °F (37.1 °C)   SpO2: 99%       Physical Exam  Vitals signs and nursing note reviewed. Constitutional:       General: She is not in acute distress. Appearance: Normal appearance. She is normal weight. She is not ill-appearing or toxic-appearing. Comments: Pt examined in a vehicle  Well appearing and converational   HENT:      Nose: No rhinorrhea. Mouth/Throat:      Mouth: Mucous membranes are moist.      Pharynx: Oropharynx is clear. No oropharyngeal exudate or posterior oropharyngeal erythema. Neck:      Musculoskeletal: Normal range of motion and neck supple. Cardiovascular:      Rate and Rhythm: Normal rate and regular rhythm. Heart sounds: Normal heart sounds. Pulmonary:      Effort: Pulmonary effort is normal. No respiratory distress. Breath sounds: Normal breath sounds. No stridor. No wheezing, rhonchi or rales. Comments: No cough  Abdominal:      General: Bowel sounds are normal.   Lymphadenopathy:      Cervical: No cervical adenopathy. Neurological:      Mental Status: She is alert and oriented to person, place, and time.    Psychiatric:         Mood and Affect: Mood normal.         Behavior: Behavior normal.         MDM    ICD-10-CM ICD-9-CM    1. Encounter for laboratory testing for COVID-19 virus  Z20.828 V01.79 NOVEL CORONAVIRUS (COVID-19)   2. Cough  R05 786.2 NOVEL CORONAVIRUS (COVID-19)   3. Nasal congestion  R09.81 478.19 NOVEL CORONAVIRUS (COVID-19)     No orders of the defined types were placed in this encounter. The patient's condition and possible alternative diagnoses were discussed with the patient and they verbalized understanding. The patient is to follow up with their primary care doctor for continued care. If signs and symptoms persist or become worse or new symptoms develop, the pt is to go immediately to the emergency department. Any new medications that may have been written for should be taken as directed but should always be discussed with the primary care physician and pharmacist. This was communicated to the patient. Pt offered strep and flu test but stated she only wants COVID because of sx and exposure      Pt instructed to quarantine until COVID testing results are back and then duration of quarantine will depend on result, current recommendations and symptoms. The patient is to get immediate re-evaluation for any new or worsening symptoms. They are to quarantine from other household members. It was recommended they stay hydrated and practice deep breathing exercises.          Procedures

## 2020-11-27 LAB — SARS-COV-2, NAA: NOT DETECTED

## 2021-02-25 ENCOUNTER — OFFICE VISIT (OUTPATIENT)
Dept: URGENT CARE | Age: 26
End: 2021-02-25
Payer: MEDICAID

## 2021-02-25 VITALS — HEART RATE: 72 BPM | OXYGEN SATURATION: 100 % | TEMPERATURE: 98.2 F | RESPIRATION RATE: 18 BRPM

## 2021-02-25 DIAGNOSIS — R51.9 ACUTE NONINTRACTABLE HEADACHE, UNSPECIFIED HEADACHE TYPE: Primary | ICD-10-CM

## 2021-02-25 DIAGNOSIS — J02.9 SORE THROAT: ICD-10-CM

## 2021-02-25 DIAGNOSIS — Z20.822 EXPOSURE TO COVID-19 VIRUS: ICD-10-CM

## 2021-02-25 LAB — SARS-COV-2 POC: NEGATIVE

## 2021-02-25 PROCEDURE — 87426 SARSCOV CORONAVIRUS AG IA: CPT | Performed by: FAMILY MEDICINE

## 2021-02-25 PROCEDURE — 99213 OFFICE O/P EST LOW 20 MIN: CPT | Performed by: FAMILY MEDICINE

## 2021-02-25 NOTE — LETTER
February 25, 2021 Lakesha Wayne  
[address] [facility] [address] [address] Dear Steven Wong: Thank you for requesting access to Disconnect. Please follow the instructions below to securely access and download your online medical record. Disconnect allows you to send messages to your doctor, view your test results, renew your prescriptions, schedule appointments, and more. How Do I Sign Up? 1. In your internet browser, go to [email or web address]. 2. Click on the First Time User? Click Here link in the Sign In box. You will see the New Member Sign Up page. 3. Enter your Disconnect Access Code exactly as it appears below. You will not need to use this code after youve completed the sign-up process. If you do not sign up before the expiration date, you must request a new code. Disconnect Access Code: [email or web address] Expires: 4/11/2021  4:15 PM  
 
4. Enter the last four digits of your Social Security Number (xxxx) and Date of Birth (mm/dd/yyyy) as indicated and click Submit. You will be taken to the next sign-up page. 5. Create a Disconnect ID. This will be your Disconnect login ID and cannot be changed, so think of one that is secure and easy to remember. 6. Create a Disconnect password. You can change your password at any time. 7. Enter your Password Reset Question and Answer. This can be used at a later time if you forget your password. 8. Enter your e-mail address. You will receive e-mail notification when new information is available in [address]. 9. Click Sign Up. You can now view and download portions of your medical record. 10. Click the Download Summary menu link to download a portable copy of your medical information. Additional Information If you have questions, please visit the Frequently Asked Questions section of the Disconnect website at [email or web address] Remember, Disconnect is NOT to be used for urgent needs. For medical emergencies, dial 911.  
 
Now available from your iPhone and Android! Sincerely, [employer]

## 2021-02-25 NOTE — PROGRESS NOTES
This patient was seen at [address] [place] [address] Urgent Care while in their vehicle due to COVID-19 pandemic with PPE and focused examination in order to decrease community viral transmission. The patient/guardian gave verbal consent to treat. Zoe Inman is a 30 y.o. female who presents with HA x 4 days. Had ST, now resolved. Was exposed to COVID-19 2 weeks ago. Denies cough, fever, SOB. The history is provided by the patient. Past Medical History:   Diagnosis Date    Vaginal delivery         Past Surgical History:   Procedure Laterality Date    HX HEENT           History reviewed. No pertinent family history.      Social History     Socioeconomic History    Marital status: SINGLE     Spouse name: Not on file    Number of children: Not on file    Years of education: Not on file    Highest education level: Not on file   Occupational History    Not on file   Social Needs    Financial resource strain: Not on file    Food insecurity     Worry: Not on file     Inability: Not on file    Transportation needs     Medical: Not on file     Non-medical: Not on file   Tobacco Use    Smoking status: Current Every Day Smoker    Smokeless tobacco: Never Used   Substance and Sexual Activity    Alcohol use: No    Drug use: Not on file    Sexual activity: Not on file   Lifestyle    Physical activity     Days per week: Not on file     Minutes per session: Not on file    Stress: Not on file   Relationships    Social connections     Talks on phone: Not on file     Gets together: Not on file     Attends Denominational service: Not on file     Active member of club or organization: Not on file     Attends meetings of clubs or organizations: Not on file     Relationship status: Not on file    Intimate partner violence     Fear of current or ex partner: Not on file     Emotionally abused: Not on file     Physically abused: Not on file     Forced sexual activity: Not on file   Other Topics Concern    Not on file   Social History Narrative    Not on file                ALLERGIES: Patient has no known allergies. Review of Systems   Constitutional: Negative for activity change, appetite change, chills and fever. HENT: Positive for sore throat. Respiratory: Negative for cough, shortness of breath and wheezing. Gastrointestinal: Negative for diarrhea, nausea and vomiting. Musculoskeletal: Negative for myalgias. Neurological: Positive for headaches. Vitals:    02/25/21 1630   Pulse: 72   Resp: 18   Temp: 98.2 °F (36.8 °C)   SpO2: 100%       Physical Exam  Vitals signs and nursing note reviewed. Constitutional:       General: She is not in acute distress. Appearance: She is well-developed. She is not diaphoretic. Pulmonary:      Effort: Pulmonary effort is normal. No respiratory distress. Breath sounds: Normal breath sounds. No stridor. No wheezing, rhonchi or rales. Neurological:      Mental Status: She is alert. Psychiatric:         Behavior: Behavior normal.         Thought Content: Thought content normal.         Judgment: Judgment normal.         MDM     Amount and/or Complexity of Data Reviewed:   Clinical lab tests:  Ordered and reviewed      ICD-10-CM ICD-9-CM   1. Acute nonintractable headache, unspecified headache type  R51.9 784.0   2. Sore throat  J02.9 462   3.  Exposure to COVID-19 virus  Z20.822 V01.79       Orders Placed This Encounter    NOVEL CORONAVIRUS (COVID-19)     Scheduling Instructions:      1) Due to current limited availability of the COVID-19 PCR test, tests will be prioritized and may not be completed.              2) Order only if the test result will change clinical management or necessary for a return to mission-critical employment decision.              3) Print and instruct patient to adhere to CDC home isolation program. (Link Above)              4) Set up or refer patient for a monitoring program.              5) Have patient sign up for and leverage MyChart (if not previously done). Order Specific Question:   Is this test for diagnosis or screening? Answer:   Diagnosis of ill patient     Order Specific Question:   Symptomatic for COVID-19 as defined by CDC? Answer:   Yes     Order Specific Question:   Date of Symptom Onset     Answer:   2/22/2021     Order Specific Question:   Hospitalized for COVID-19? Answer:   No     Order Specific Question:   Admitted to ICU for COVID-19? Answer:   No     Order Specific Question:   Employed in healthcare setting? Answer:   No     Order Specific Question:   Resident in a congregate (group) care setting? Answer:   No     Order Specific Question:   Pregnant? Answer:   No     Order Specific Question:   Previously tested for COVID-19? Answer: Yes    AMB POC SARS-COV-2     Order Specific Question:   Is this test for diagnosis or screening? Answer:   Diagnosis of ill patient     Order Specific Question:   Symptomatic for COVID-19 as defined by CDC? Answer:   Yes     Order Specific Question:   Date of Symptom Onset     Answer:   2/22/2021     Order Specific Question:   Hospitalized for COVID-19? Answer:   No     Order Specific Question:   Admitted to ICU for COVID-19? Answer:   No     Order Specific Question:   Employed in healthcare setting? Answer:   No     Order Specific Question:   Resident in a congregate (group) care setting? Answer:   No     Order Specific Question:   Pregnant? Answer:   No     Order Specific Question:   Previously tested for COVID-19? Answer:   Yes        Quarantine, await results  Deep breathing exercises, ambulation  MyChart: activation letter given  Provider will call if test is positive     If signs and symptoms become worse the pt is to go to the ER.      Results for orders placed or performed in visit on 02/25/21   AMB POC SARS-COV-2   Result Value Ref Range    SARS-COV-2 POC Negative Negative       Procedures

## 2021-02-26 LAB — SARS-COV-2, NAA: NOT DETECTED

## 2021-03-24 ENCOUNTER — APPOINTMENT (OUTPATIENT)
Dept: CT IMAGING | Age: 26
End: 2021-03-24
Attending: NURSE PRACTITIONER
Payer: COMMERCIAL

## 2021-03-24 ENCOUNTER — HOSPITAL ENCOUNTER (EMERGENCY)
Age: 26
Discharge: HOME | End: 2021-03-24
Attending: EMERGENCY MEDICINE | Admitting: EMERGENCY MEDICINE
Payer: COMMERCIAL

## 2021-03-24 VITALS
SYSTOLIC BLOOD PRESSURE: 112 MMHG | HEART RATE: 91 BPM | OXYGEN SATURATION: 100 % | TEMPERATURE: 97.8 F | RESPIRATION RATE: 16 BRPM | DIASTOLIC BLOOD PRESSURE: 71 MMHG
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
## 2021-03-24 LAB
COMMENT, HOLDF: NORMAL
CREAT SERPL-MCNC: 0.51 MG/DL (ref 0.55–1.02)
HCG UR QL: NEGATIVE
SAMPLES BEING HELD,HOLD: NORMAL

## 2021-03-24 PROCEDURE — 82565 ASSAY OF CREATININE: CPT

## 2021-03-24 PROCEDURE — 74011000636 HC RX REV CODE- 636: Performed by: RADIOLOGY

## 2021-03-24 PROCEDURE — 81025 URINE PREGNANCY TEST: CPT

## 2021-03-24 PROCEDURE — 70498 CT ANGIOGRAPHY NECK: CPT

## 2021-03-24 PROCEDURE — 36415 COLL VENOUS BLD VENIPUNCTURE: CPT

## 2021-03-24 PROCEDURE — 99283 EMERGENCY DEPT VISIT LOW MDM: CPT

## 2021-03-24 PROCEDURE — 99284 EMERGENCY DEPT VISIT MOD MDM: CPT

## 2021-03-24 RX ORDER — SODIUM CHLORIDE 0.9 % (FLUSH) 0.9 %
5-40 SYRINGE (ML) INJECTION EVERY 8 HOURS
Status: DISCONTINUED | OUTPATIENT
Start: 2021-03-24 | End: 2021-03-24 | Stop reason: HOSPADM

## 2021-03-24 RX ORDER — SODIUM CHLORIDE 0.9 % (FLUSH) 0.9 %
5-40 SYRINGE (ML) INJECTION AS NEEDED
Status: DISCONTINUED | OUTPATIENT
Start: 2021-03-24 | End: 2021-03-24 | Stop reason: HOSPADM

## 2021-03-24 RX ADMIN — IOPAMIDOL 100 ML: 755 INJECTION, SOLUTION INTRAVENOUS at 10:41
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
## 2021-03-24 NOTE — FORENSIC NURSE
ALLAN Quiroga and ELVA Rosado saw patient in ED. [facility] after forensic exam complete.

## 2021-04-02 ENCOUNTER — OFFICE VISIT (OUTPATIENT)
Dept: URGENT CARE | Age: 26
End: 2021-04-02
Payer: MEDICAID

## 2021-04-02 VITALS — RESPIRATION RATE: 16 BRPM | HEART RATE: 70 BPM | OXYGEN SATURATION: 100 % | TEMPERATURE: 98.4 F

## 2021-04-02 DIAGNOSIS — Z20.822 EXPOSURE TO COVID-19 VIRUS: Primary | ICD-10-CM

## 2021-04-02 PROCEDURE — 99212 OFFICE O/P EST SF 10 MIN: CPT | Performed by: INTERNAL MEDICINE

## 2021-04-02 RX ORDER — DESIPRAMINE HYDROCHLORIDE 10 MG/1
TABLET ORAL
COMMUNITY
Start: 2021-03-13

## 2021-04-04 LAB
SARS-COV-2, NAA 2 DAY TAT: NORMAL
SARS-COV-2, NAA: NOT DETECTED

## 2023-05-21 RX ORDER — CETIRIZINE HYDROCHLORIDE 10 MG/1
10 TABLET ORAL DAILY
COMMUNITY
Start: 2019-10-28

## 2023-05-21 RX ORDER — BUPRENORPHINE AND NALOXONE 8; 2 MG/1; MG/1
FILM, SOLUBLE BUCCAL; SUBLINGUAL DAILY
COMMUNITY

## 2023-05-21 RX ORDER — DESIPRAMINE HYDROCHLORIDE 10 MG/1
1 TABLET ORAL DAILY
COMMUNITY
Start: 2021-03-13

## 2024-01-13 ENCOUNTER — OFFICE VISIT (OUTPATIENT)
Age: 29
End: 2024-01-13

## 2024-01-13 VITALS
SYSTOLIC BLOOD PRESSURE: 115 MMHG | HEART RATE: 112 BPM | HEIGHT: 62 IN | BODY MASS INDEX: 30.73 KG/M2 | OXYGEN SATURATION: 98 % | WEIGHT: 167 LBS | RESPIRATION RATE: 16 BRPM | DIASTOLIC BLOOD PRESSURE: 82 MMHG | TEMPERATURE: 98.2 F

## 2024-01-13 DIAGNOSIS — J20.9 BRONCHITIS, ACUTE, WITH BRONCHOSPASM: Primary | ICD-10-CM

## 2024-01-13 RX ORDER — PREDNISONE 50 MG/1
50 TABLET ORAL DAILY
Qty: 5 TABLET | Refills: 0 | Status: SHIPPED | OUTPATIENT
Start: 2024-01-13 | End: 2024-01-18

## 2024-01-13 RX ORDER — BUPROPION HYDROCHLORIDE 300 MG/1
300 TABLET ORAL DAILY
COMMUNITY
Start: 2023-12-18

## 2024-01-13 RX ORDER — DOXYCYCLINE HYCLATE 100 MG
100 TABLET ORAL 2 TIMES DAILY
Qty: 14 TABLET | Refills: 0 | Status: SHIPPED | OUTPATIENT
Start: 2024-01-13 | End: 2024-01-20

## 2024-01-13 RX ORDER — ELAGOLIX 150 MG/1
1 TABLET, FILM COATED ORAL DAILY
COMMUNITY
Start: 2023-12-19

## 2024-01-13 RX ORDER — ALBUTEROL SULFATE 90 UG/1
2 AEROSOL, METERED RESPIRATORY (INHALATION) 4 TIMES DAILY PRN
Qty: 18 G | Refills: 0 | Status: SHIPPED | OUTPATIENT
Start: 2024-01-13

## 2024-01-13 ASSESSMENT — ENCOUNTER SYMPTOMS
HEMOPTYSIS: 0
SHORTNESS OF BREATH: 1
HEARTBURN: 0
WHEEZING: 1
COUGH: 1
CHEST TIGHTNESS: 1

## 2024-01-13 NOTE — PROGRESS NOTES
Chief Complaint   Patient presents with    Cough     Sx started ~2 weeks, coughing, sore throat, congestion, unsure of fever but has felt hot/sweaty. Vomiting the first week but none since.          Cough  This is a new problem. The current episode started 1 to 4 weeks ago (2). The problem has been unchanged. The problem occurs every few minutes. The cough is Productive of sputum. Associated symptoms include chest pain (IN MID CHEST WITH COUGHING - CHEST CONGESTIN), shortness of breath and wheezing. Pertinent negatives include no heartburn, hemoptysis, myalgias, nasal congestion or postnasal drip. The symptoms are aggravated by lying down. Risk factors for lung disease include smoking/tobacco exposure. She has tried OTC cough suppressant for the symptoms. The treatment provided mild relief. Her past medical history is significant for bronchitis. There is no history of asthma.       Past Medical History:   Diagnosis Date    Depression     Vaginal delivery        Past Surgical History:   Procedure Laterality Date    HEENT         Social History     Tobacco Use    Smoking status: Every Day    Smokeless tobacco: Never   Substance Use Topics    Alcohol use: No        No Known Allergies     Review of Systems   HENT:  Positive for congestion. Negative for postnasal drip.    Respiratory:  Positive for cough, chest tightness, shortness of breath and wheezing. Negative for hemoptysis.    Cardiovascular:  Positive for chest pain (IN MID CHEST WITH COUGHING - CHEST CONGESTIN).   Gastrointestinal:  Negative for heartburn.   Musculoskeletal:  Negative for myalgias.   All other systems reviewed and are negative.       /82 (Site: Right Upper Arm, Position: Sitting)   Pulse (!) 112   Temp 98.2 °F (36.8 °C) (Oral)   Resp 16   Ht 1.575 m (5' 2\")   Wt 75.8 kg (167 lb)   SpO2 98%   BMI 30.54 kg/m²      Physical Exam  Vitals and nursing note reviewed.   Constitutional:       General: She is not in acute distress.

## 2024-01-30 ENCOUNTER — OFFICE VISIT (OUTPATIENT)
Age: 29
End: 2024-01-30

## 2024-01-30 VITALS
OXYGEN SATURATION: 98 % | SYSTOLIC BLOOD PRESSURE: 126 MMHG | WEIGHT: 168.4 LBS | HEART RATE: 100 BPM | TEMPERATURE: 98.4 F | BODY MASS INDEX: 30.8 KG/M2 | DIASTOLIC BLOOD PRESSURE: 88 MMHG

## 2024-01-30 DIAGNOSIS — J06.9 VIRAL UPPER RESPIRATORY TRACT INFECTION: Primary | ICD-10-CM

## 2024-01-30 RX ORDER — PREDNISONE 20 MG/1
20 TABLET ORAL 2 TIMES DAILY
Qty: 10 TABLET | Refills: 0 | Status: SHIPPED | OUTPATIENT
Start: 2024-01-30 | End: 2024-02-04

## 2024-01-30 RX ORDER — BENZONATATE 200 MG/1
200 CAPSULE ORAL 3 TIMES DAILY PRN
Qty: 21 CAPSULE | Refills: 0 | Status: SHIPPED | OUTPATIENT
Start: 2024-01-30 | End: 2024-02-06

## 2024-01-30 ASSESSMENT — ENCOUNTER SYMPTOMS
COUGH: 1
SORE THROAT: 1
SHORTNESS OF BREATH: 0

## 2024-01-30 NOTE — PROGRESS NOTES
Subjective     Chief Complaint   Patient presents with    Other     Seen 1/13 at [facility] sx started around 1/1/24  cough , sore throat and congestion , pressure where pt feels like she is under water.       Patient ID:  Sigifredo Nation is a 28 y.o. female.    Patient is 28 year old female presenting with dry cough, sore throat and congestion.  Symptoms began approximately one month ago. She denies fever or chills.  She was seen at  on 1/13 and given antibiotics and steroids.  States she initially felt better but has now begun to feel worse.            Review of Systems   Constitutional:  Negative for chills and fever.   HENT:  Positive for congestion and sore throat.    Respiratory:  Positive for cough. Negative for shortness of breath.        Past Medical History:   Diagnosis Date    Depression     Vaginal delivery        Past Surgical History:   Procedure Laterality Date    HEENT         History reviewed. No pertinent family history.    No Known Allergies    Social History     Tobacco Use    Smoking status: Every Day    Smokeless tobacco: Never   Substance Use Topics    Alcohol use: No    Drug use: Never       Objective   Vitals:    01/30/24 1247   BP: 126/88   Pulse: 100   Temp: 98.4 °F (36.9 °C)   SpO2: 98%     Physical Exam  Constitutional:       General: She is not in acute distress.     Appearance: Normal appearance. She is not ill-appearing.   HENT:      Head: Normocephalic and atraumatic.   Cardiovascular:      Rate and Rhythm: Normal rate and regular rhythm.      Pulses: Normal pulses.      Heart sounds: Normal heart sounds.   Pulmonary:      Effort: Pulmonary effort is normal.      Breath sounds: Normal breath sounds.   Skin:     General: Skin is warm and dry.   Neurological:      Mental Status: She is alert and oriented to person, place, and time.         Assessment & Plan     Diagnoses and all orders for this visit:  Viral upper respiratory tract infection  -     XR CHEST (2 VIEWS); Future  -